# Patient Record
Sex: MALE | Race: WHITE
[De-identification: names, ages, dates, MRNs, and addresses within clinical notes are randomized per-mention and may not be internally consistent; named-entity substitution may affect disease eponyms.]

---

## 2018-05-21 NOTE — 2DMMODE
[place] [address]
Phone:  [phone] 2 D/M-MODE ECHOCARDIOGRAM     
_______________________________________________________________________________
 
Name:         MELLY BLANKENSHIP            Room:                     REG CLI
M.R.#:    [id number]     Account #:     [id number]  
Admission:    18    Attend Phys:   Jax Stapleton
Discharge:                Date of Birth: [birth year]  
Date of Service: 18 1526  Report #:      [id number]
        [id number]
_______________________________________________________________________________
THIS REPORT FOR:  //name//                      
 
 
--------------- APPROVED REPORT --------------
 
 
Study performed:  2018 14:17:01
 
EXAM: Comprehensive 2D, Doppler, and color-flow 
Echocardiogram 
Patient Location: Out-Patient   
      Status:  routine
 
      BSA:         2.07
HR: 75 bpm BP:          120/80 mmHg 
 
Other Information 
Study Quality: Good
 
Indications
Murmur
 
2D Dimensions
   LVEF(%):  56.77 (&gt;50%)
IVSd:  13.30 (7-11mm) LVOT Diam:  20.00 (18-24mm) 
LVDd:  36.25 mm  
PWd:  10.90 (7-11mm) Ascending Ao:  28.84 (22-36mm)
LVDs:  25.71 (25-40mm) 
Aortic Root:  28.02 mm 
   Valencia's LVEF:  56.77 %
 
Volumes
Left Atrial Volume (Systole) 
    LA ESV Index:  9.90 mL/m2
 
Aortic Valve
AoV Peak Oscar.:  0.98 m/s 
AO Peak Gr.:  3.82 mmHg  LVOT Max PG:  3.79 mmHg
AO Mean Gr.:  2.08 mmHg  LVOT Mean P.83 mmHg
    LVOT Max V:  0.97 m/s
AO V2 VTI:  17.69 cm  LVOT Mean V:  0.62 m/s
HYACINTH (VTI):  3.61 cm2  LVOT V1 VTI:  20.32 cm
 
Mitral Valve
    E/A Ratio:  0.64
    MV Decel. Time:  292.14 ms
 
 
[place] [address]
Phone:  [phone]                     2 D/M-MODE ECHOCARDIOGRAM     
_______________________________________________________________________________
 
Name:         MELLY BLANKENSHIP            Room:                     REG CLI
M.R.#:    [id number]     Account #:     [id number]  
Admission:    18    Attend Phys:   Jax Stapleton
Discharge:                Date of Birth: [birth year]  
Date of Service: 18 1526  Report #:      [id number]
        [id number]
_______________________________________________________________________________
MV E Max Oscar.:  0.43 m/s 
MV PHT:  84.72 ms  
MVA (PHT):  2.60 cm2  
 
TDI
E/Lateral E':  6.14 E/Medial E':  6.14
   Medial E' Oscar.:  0.07 m/s
   Lateral E' Oscar.:  0.07 m/s
 
Pulmonary Valve
PV Peak Oscar.:  1.31 m/s PV Peak Gr.:  6.84 mmHg
 
Tricuspid Valve
TR Peak Gr.:  19.87 mmHg RVSP:  24.87 mmHg
 
Left Ventricle
The left ventricle is normal size. There is normal LV segmental wall 
motion. There is normal left ventricular wall thickness. Left 
ventricular systolic function is normal. The left ventricular 
ejection fraction is within the normal range. LVEF is 55-60%. Grade I 
- abnormal relaxation pattern.
 
Right Ventricle
The right ventricle is normal size. The right ventricular systolic 
function is normal.
 
Atria
The left atrium size is normal. The right atrium size is 
normal.
 
Aortic Valve
Aortic valve is mildly calcified. Mild aortic regurgitation. There is 
no aortic valvular stenosis.
 
Mitral Valve
The mitral valve is normal in structure. Mild mitral regurgitation. 
No evidence of mitral valve stenosis.
 
Tricuspid Valve
The tricuspid valve is normal in structure. Mild tricuspid 
regurgitation. The RVSP is 30_ mmHg.
 
Pulmonic Valve
The pulmonary valve is normal in structure. There is no pulmonic 
valvular regurgitation.
 
 
 
[place] [address]
Phone:  [phone]                     2 D/M-MODE ECHOCARDIOGRAM     
_______________________________________________________________________________
 
Name:         MELLY BLANKENSHIP            Room:                     REG CLI
M.RKierra#:    [id number]     Account #:     [id number]  
Admission:    18    Attend Phys:   Jax Stapleton
Discharge:                Date of Birth: [birth year]  
Date of Service: 18 1526  Report #:      [id number]
        [id number]
_______________________________________________________________________________
Great Vessels
The aortic root is normal in size. IVC is normal in size and 
collapses with &gt;50% inspiration
 
Pericardium
There is no pericardial effusion.
 
&lt;Conclusion&gt;
Aortic valve is mildly calcified.
Mild mitral regurgitation.
Mild aortic regurgitation.
LVEF is 55-60%.
 
 
 
 
 
 
 
 
 
 
 
 
 
 
 
 
 
 
 
 
 
 
 
 
 
 
 
 
 
 
 
 
  <ELECTRONICALLY SIGNED>
                                           By: Edmar Velazquez MD, FACC      
  18     1526
D: 18 1526   _____________________________________
T: 18 1526   Edmar Velazquez MD, FACC        /INF

## 2018-10-05 NOTE — NUR
PATIENT ARRIVED TO THE UNIT AT APPROX 1500. ALERT AND ORIENTE X4. ADMISSION
HISTORY AND ASSESSMENT COMPLETED AND AS CHARTED. VSS ON ROOM AIR. NO
COMPLAINTS OF PAIN, NAUSEA, OR SOA. FLUIDS STARTED AND INFUSING AS ORDERED.
URINAL AND STRAINER PLACED IN RESTROOM TO MONITOR FOR STONE. HOURLY ROUNDS
MAINTAINED, CALL LIGHT WITHIN REACH, NURSING WILL CONTINUE TO MONITOR.

## 2018-10-05 NOTE — EKG
[place] [address]
Phone:  [phone]                     ELECTROCARDIOGRAM REPORT      
_______________________________________________________________________________
 
Name:       MELLY BLANKENSHIP             Room:           [address]    [place]  
.R.#:  [id number]      Account #:      [id number]  
Admission:  10/05/18     Attend Phys:    Giancarlo Gomez, 
Discharge:               Date of Birth:  [birth year]  
         Report #: [id number]
    [id number]
_______________________________________________________________________________
THIS REPORT FOR:  //name//                      
 
                         [facility] ED
                                       
Test Date:    2018-10-05               Test Time:    09:14:45
Pat Name:     MELLY BLANKENSHIP         Department:   
Patient ID:   [id number]            Room:         [facility]
Gender:                               Technician:   ALYSSIA KEMP
:          1940               Requested By: Familia Krishnamurthy
Order Number: [id number] MD:   Bc Lobo
                                 Measurements
Intervals                              Axis          
Rate:         87                       P:            44
PA:           152                      QRS:          -37
QRSD:         99                       T:            19
QT:           384                                    
QTc:          462                                    
                           Interpretive Statements
Sinus rhythm
Ventricular premature complex
Left axis deviation
Compared to ECG 10/20/2016 12:26:51
Ventricular premature complex(es) now present
 
Electronically Signed On 10-5-2018 16:31:35 CDT by Bc Lobo
[email or web address]
 
 
 
 
 
 
 
 
 
 
 
 
 
 
 
 
 
  <ELECTRONICALLY SIGNED>
                                           By: Bc Lobo MD, [facility]     
  10/05/18     1631
D: 10/05/18 0914   _____________________________________
T: 10/05/18 0914   Bc Lobo MD, [facility]       /EPI

## 2018-10-06 NOTE — NUR
Alert and oriented x 4. Vitals are stable. He is up independently in his room.
He was voiding and straining his urine himself. He has had nothing by mouth
since midnight. He slept well.

## 2018-10-06 NOTE — NUR
PATIENT ALERT AND ORIENTED X 4. VITAL SIGNS STABLE ON ROOM AIR. UP AD SHANA IN
ROOM. IV PATENT WITH FLUIDS INFUSING. DENIES PAIN AND NAUSEA. PATIENT HAD
CYSTOSCOPY TODAY WITH LEFT RETROGRADE PYELOGRAM & URETEROSCOPY WITH STONE
EXTRACTION & STENT PLACEMENT. URINE IS BLOOD TINGED DUE TO PROCEDURE. VOIDING
WITHOUT DIFFICULTY. HOURLY ROUNDS MAINTAINED THROUGHOUT THE SHIFT. CALL LIGHT
WITHIN REACH. NURSING WILL CONTINUE TO MONITOR.

## 2018-10-06 NOTE — NUR
PATIENT ARRIVED BACK TO UNIT FROM PACU AT 1120. VITAL SIGNS STABLE ON ROOM
AIR. DENIES PAIN AND NAUSEA. REGULAR DIET STARTED. DRESSING TO PENIS/SCROTAL
AREA C/D/I. RESTING COMFORTABLY.  WILL CONTINUE HOURLY ROUNDS. CALL LIGHT
WITHIN REACH. NURSING WILL CONTINUE TO MONITOR.

## 2018-10-07 NOTE — NUR
ALERT AND ORIENTED X4.  DENIED NEED FOR PAIN MEDICATIONS.  UP AD SHANA IN ROOM.
URINE BROWN IN COLOR.  PATIENT VOIDING WITHOUT DIFFICULTY.  CONTINUE TO STRAIN
ALL URINE.  IVF INFUSING WITHOUT DIFFICULTY.  CALL LIGHT WITHIN REACH.

## 2018-10-07 NOTE — OP
[address]  [address]                    OPERATIVE REPORT              
_______________________________________________________________________________
 
Name:       MELLY BLANKENSHIP             Room:           [address] [place]  
.R.#:  [id number]      Account #:      [id number]  
Admission:  10/05/18     Attend Phys:    Giancarlo Gomez, 
Discharge:               Date of Birth:  [birth year]  
         Report #: [id number]
                                                                     [id number]  
_______________________________________________________________________________
THIS REPORT FOR:  //name//                      
 
CC: Giancarlo Stapleton
 
DATE OF SERVICE:  10/06/2018
 
 
PREOPERATIVE DIAGNOSES:
1.  Ureteral stone.
2.  Renal stone.
3.  Renal failure due to ureteral obstruction.
 
POSTOPERATIVE DIAGNOSES:
1.  Ureteral stone.
2.  Renal stone.
3.  Renal failure due to ureteral obstruction.
 
PROCEDURE PERFORMED:  Cystoscopy, left retrograde pyelogram, ureteroscopy, stone
extraction and stent placement.
 
SURGEON:  Benoit Lorenzo MD.
 
ANESTHESIA:  General.
 
ESTIMATED BLOOD LOSS:  Minimal.
 
COMPLICATIONS:  None.
 
INDICATION FOR PROCEDURE:  This is a 77-year-old gentleman who was hospitalized
for severe flank pain.  He had a CT scan, which revealed a distal left ureteral
stone, hydronephrosis and several renal stones.  His creatinine was also
elevated indicating some renal failure.  His options for management were
discussed in detail.  He elected to proceed with cystoscopy, left retrograde
pyelogram, ureteroscopy, laser lithotripsy and stent placement.  The risks,
benefits, possible complications were explained in detail to both he and his
family.  They voiced clear understanding and would like to proceed.
 
DESCRIPTION OF PROCEDURE:  After obtaining informed consent, the patient was
taken to the operating room and placed in supine position.  After adequate
general anesthesia and IV antibiotics, he was prepped and draped in the dorsal
lithotomy position.  A 21-Citizen of [place] cystoscope with 30-degree lens was introduced
into the anterior urethra, which was normal all the way down the prostatic
urethra with moderate prostatic enlargement.  Upon entering the bladder, the
bladder was systematically inspected.  There were no tumors or diverticula. 
There was a small 1-mm fragment of stone at the base.  This was irrigated out. 
 
 
 
[place] [address]                    OPERATIVE REPORT              
_______________________________________________________________________________
 
Name:       MELLY BLANKENSHIP             Room:           [address] [place]  
..#:  [id number]      Account #:      [id number]  
Admission:  10/05/18     Attend Phys:    Giancarlo Gomez, 
Discharge:               Date of Birth:  [birth year]  
         Report #: [id number]
                                                                     [id number]  
_______________________________________________________________________________
It did not appear to match the size of the stone in his ureter visualized on
CT,
so a left
retrograde pyelogram was performed using an open-ended ureteral catheter.  There
was a very stenotic appearing intramural ureter with a significant amount of
proximal hydroureter.  There is a possible filling defect in the distal ureter,
so a sensor guidewire was passed in retrograde fashion.  In doing so
hydronephrotic jet expelled another piece of stone.  This was flushed out of the
bladder.  Rigid ureteroscopy was carried out alongside the wire.  No visible
stones were identified within the distal ureter.  So, an access sheath was
gently passed over the wire under fluoroscopic guidance.  This was an 11 x
13-Citizen of [place] access sheath.  It passed quite easily.  A second wire was placed as a
safety wire and flexible ureteroscopy was carried out up into the kidney.  All
the calices were inspected.  There were 3 separate stones ranging from 2 mm to 4
mm.  These were basketed and retrieved atraumatically through the dilated
proximal ureter and 
access
sheath.
 The remainder of the kidney was inspected.  There were several small Nagi's
plaques, but no other stones within the collecting system.  The access sheath
was removed.  The ureter was inspected with removal of the ureteroscope.  There
was no evidence of any ureteral injury.  No evidence of any stones within the
ureter.  There was some edema in the distal ureter where the stone had been
lodged.  It was determined that a stent would be replaced.  The cystoscope was
placed in the bladder and a 4.8 x 28 cm double-J stent was passed in retrograde
fashion.  A good coil was noted overlying the renal pelvis.  A good coil was
directly visualized in the bladder.  Bladder was drained.  Uro-Jet was applied
per urethra.  The patient was extubated and taken to recovery room in good
condition.  The plan is to return his care to the floor and check his
creatinine.  When stable, he can discharge home and follow up in 1-2 weeks for
stent removal.
 
 
 
 
 
 
 
 
 
 
 
 
 
<ELECTRONICALLY SIGNED>
                                        By:  Benoit Lorenzo MD         
10/07/18     1429
D: 10/06/18 1800_______________________________________
T: 10/06/18 1853Jarenny Lorenzo MD            /nt

## 2018-10-07 NOTE — CON
[address]  [address]                    CONSULTATION                  
_______________________________________________________________________________
 
Name:       MELLY BLANKENSHIP             Room:           [address] [place]  
..#:  [id number]      Account #:      [id number]  
Admission:  10/05/18     Attend Phys:    Giancarlo Gomez, 
Discharge:               Date of Birth:  [birth year]  
         Report #: [id number]
                                                                     [id number]  
_______________________________________________________________________________
THIS REPORT FOR:  //name//                      
 
CC: Giancarlo Gomez
    Jax Deyo
 
DATE OF SERVICE:  10/06/2018
 
 
NEPHROLOGY CONSULTATION
 
CONSULTING PHYSICIAN:  Dr. Gomez.
 
REASON FOR NEPHROLOGY CONSULTATION:  Acute kidney injury.
 
CHIEF COMPLAINT:  Left lower quadrant and flank pain.
 
HISTORY OF PRESENT ILLNESS:  This is a very pleasant 77-year-old male who has
past medical history of kidney stones.  He takes Topamax for neuropathy and he
states that he was started on that 5 months ago.  He came in with left flank
pain and left-sided abdominal pain.  He denied any dysuria.  Abdominal imaging
showed evidence of a 3-mm left UPJ stone causing moderate obstructive uropathy
with left-sided hydronephrosis and perinephric stranding.  He also has bilateral
stone burden, which were like small in nature.  His creatinine was 1.1 in 2016,
but creatinine at this time was 2.2 on admission and 2.4 this morning as well as
rest of the evening.  Urology has already seen him.  He does take ibuprofen
intermittently.  He does not take any ACE inhibitor or ARB.  He also had an
episode of nausea and vomiting yesterday, but his symptoms are much better
today.  His urine output has not been documented.  He is actually resting
comfortably right now.
 
ALLERGIES:  No known drug allergies.
 
REVIEW OF SYSTEMS:  This is as mentioned in the history of present illness.  He
is currently not having any pain, nausea, vomiting or diarrhea.
 
PAST MEDICAL HISTORY:  Includes history of kidney stones in the past.  He has
neuropathy.  He has history of psychosis, history of suicidal ideation and
depression.
 
PAST SURGICAL HISTORY:  He denied any hip surgeries.
 
FAMILY HISTORY:  He denied any kidney disease in the family.  He did say that
his brother has history of kidney stones.
 
SOCIAL HISTORY:  He lives at home with his wife.  Denies any use of tobacco,
alcohol or illicit drug use.
 
 
 
[place] [address]                    CONSULTATION                  
_______________________________________________________________________________
 
Name:       MELLY BLANKENSHIP             Room:           [address]#:  [id number]      Account #:      [id number]  
Admission:  10/05/18     Attend Phys:    Giancarlo Gomez, 
Discharge:               Date of Birth:  [birth year]  
         Report #: [id number]
                                                                     [id number]  
_______________________________________________________________________________
 
HOME MEDICATIONS:  Include Topamax 25 mg b.i.d., primidone, olanzapine,
methotrexate, propranolol, aspirin 81, gabapentin, fluoxetine, bupropion,
escitalopram, methylphenidate, lorazepam, multivitamins and calcium supplement.
 
PHYSICAL EXAMINATION:
VITAL SIGNS:  Blood pressure is 114/76, respiratory rate is 18, pulse rate is
78, temperature 36.8 and pulse ox is 98% on room air.
GENERAL:  He is awake, alert and oriented x 3.
HEAD, EYES, EARS, NOSE AND THROAT:  Mucous membranes are moist.
NECK:  There is no JVD.
CHEST:  Clear to auscultation bilateral.  No crackles or wheezing.
CARDIOVASCULAR:  S1, S2 normal.  No murmurs, rubs or gallops.
ABDOMEN:  Soft, nondistended and nontender.  Bowel sounds are present.
BACK:  He has no CVA tenderness.
EXTREMITIES:  Lower extremities, there is no edema.
NEUROLOGICAL FUNCTION:  Gross neurological function is intact.
PSYCHIATRIC:  Mood and affect seem to be normal.
 
LABORATORY DATA:  Hemoglobin 13.4, WBC 7.0.  Creatinine 2.4, BUN 29, chloride
111 and sodium 144.  Creatinine was 2.2 yesterday morning and other labs are
reviewed.
 
IMAGING:  Chest x-ray, abdominal x-ray and abdominal and pelvic CT were
reviewed.
 
ASSESSMENT:
1.  Acute kidney injury, likely because of obstructive uropathy.  Baseline
creatinine 1.1, but creatinine is now 2.4.  U/A showed evidence of 1+ blood, but
no protein.
2.  Obstructive uropathy, left UPJ stone causing left hydronephrosis and
perinephric stranding as well as bilateral stone burden.
3.  History of neuropathy and takes Topamax for that.
4.  History of psychosis and depression.
 
PLAN:
1.  Acute kidney injury is likely because of obstructive uropathy.  I agree with
Urology's plan for urological intervention for stone retrieval and possible
stent placement.
2.  Continue IV fluids with normal saline at 100 mL an hour.
3.  Based upon his age and renal insufficiency, we will also check serum
immunofixation and serum kappa-to-lambda light chain ratio.
4.  Agree with Neurology.  Topamax causes increased stone burden and would
recommend speaking with Neurology to see if Topamax can be tapered down to off.
 
Thank you for the consultation.  Please make sure his I's and O's are being
 
 
 
[place] [address]                    CONSULTATION                  
_______________________________________________________________________________
 
Name:       MELLY BLANKENSHIP             Room:           [address] [place]  
.R.#:  [id number]      Account #:      [id number]  
Admission:  10/05/18     Attend Phys:    Giancarlo Gomez, 
Discharge:               Date of Birth:  [birth year]  
         Report #: [id number]
                                                                     [id number]  
_______________________________________________________________________________
documented.  I discussed the plan with the patient as well as the patient's
nurse and we will continue to follow with you.
 
 
 
 
 
 
 
 
 
 
 
 
 
 
 
 
 
 
 
 
 
 
 
 
 
 
 
 
 
 
 
 
 
 
 
 
 
 
 
 
 
 
<ELECTRONICALLY SIGNED>
                                        By:  Mari Wei MD            
10/07/18     0835
D: 10/06/18 0818_______________________________________
T: 10/06/18 1340Mari Wei MD               /nt

## 2018-10-07 NOTE — NUR
ASSUMED CARE OF PATIENT AFTER MORNING REPORT AT APPROX 0730. ALERT AND
ORIENTED X4. ASSESSMENT COMPLETED AND AS CHARTED. VSS ON ROOM AIR. NO
COMPLAINTS OF PAIN, NAUSEA, OR SOA. FLUIDS INFUSED AS ORDERED. PATIENT WAITING
TO BE DISCHARGED, DR CORNELL ORDERED NEURO CONSULT TO ADDRESS MEDICATION,
NEPHROLOGY SUGGESTS TOMAMAX IS CAUSING KIDNEY STONES. PATIENT IN RECLINER
WATCHIGN TV AT THIS TIME. HOURLY ROUNDS MAINTAINED, CALL LIGHT WITHIN REACH,
NURSING WILL CONTINUE TO MONITOR.

## 2018-10-08 NOTE — CON
[address]  [address]                    CONSULTATION                  
_______________________________________________________________________________
 
Name:       MELLY BLANKENSHIP             Room:           [address] [place]  
..#:  [id number]      Account #:      [id number]  
Admission:  10/05/18     Attend Phys:    Giancarlo Gomez, 
Discharge:  10/07/18     Date of Birth:  [birth year]  
         Report #: [id number]
                                                                     [id number]  
_______________________________________________________________________________
THIS REPORT FOR:  //name//                      
 
CC: Giancarlo Mijaresrosanne Daya
 
DATE OF SERVICE:  10/05/2018
 
 
REFERRING PHYSICIAN:  Dr. Gomez.
 
REASON FOR CONSULTATION:  Left ureteral calculus and renal insufficiency.
 
HISTORY OF PRESENT ILLNESS:  This is a 77-year-old male who reports multiple
stones in the past, but states they have not required procedures and have passed
spontaneously.  He denies previous urologic evaluation.  He presented to the
Emergency Department with a several-day history of left-sided abdominal and
flank pain accompanied by nausea and vomiting.  He denies fever or chills.  He
denies dysuria, gross hematuria or fragment passage.  Denies hesitancy, slow
stream or incomplete emptying.  He denies any other recent illness.  CT scan
revealed a 3 mm left ureterovesical junction stone and lab revealed a creatinine
of 2.2.  He was admitted for further evaluation and management as well as
hydration and Urology was consulted.
 
PAST MEDICAL HISTORY:  As above.  The patient also has a history of depression
and reported history of psychosis and suicidal ideation, though he denies these.
 
ALLERGIES:  No known drug allergies.
 
MEDICATIONS:  List is reviewed and includes Topamax.
 
FAMILY HISTORY:  He does not know of any family history of renal disease or
stones.
 
SOCIAL HISTORY:  He denies use of tobacco or alcohol.
 
REVIEW OF SYSTEMS:  As per the history of present illness.  He denies cough,
shortness of breath, chest pain.  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
to an infarction.  He is unsure of details regarding that.
 
PHYSICAL EXAMINATION:
VITAL SIGNS:  Temperature 36.9, pulse 66, respirations 16, blood pressure
137/83.
GENERAL:  This is a 77-year-old male in no acute distress.  He is awake, alert
and answers questions appropriately.
HEENT:  Normocephalic, atraumatic.  He has amblyopia.  Oropharynx is clear.
NECK:  Supple.  No JVD.
 
 
 
[place] [address]                    CONSULTATION                  
_______________________________________________________________________________
 
Name:       BLANKENSHIPMELLY BRIANA             Room:           [address]#:  [id number]      Account #:      [id number]  
Admission:  10/05/18     Attend Phys:    Giancarlo Gomez, 
Discharge:  10/07/18     Date of Birth:  [birth year]  
         Report #: [id number]
                                                                     [id number]  
_______________________________________________________________________________
RESPIRATORY:  Effort and excursion are normal.
CARDIOVASCULAR:  Rhythm is regular.  Radial pulses are palpable.
CHEST:  Chest wall is nontender.
ABDOMEN:  Soft, nontender, nondistended.  Spine and costovertebral angles are
nontender.  Bladder is nontender and nonpalpable.
EXTREMITIES:  Warm.  Moves all extremities well.  No peripheral edema.  Radial
pulses are palpable.
GENITOURINARY:  Reveals normal skin of the penis and scrotum.  Urethral meatus
is orthotopic.  Left testicle is somewhat atrophic with no palpable masses. 
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  No palpable inguinal hernias.
 
LABORATORY DATA:  Include a white count of 9.6, hemoglobin 15.6, platelet count
191,000.  Sodium 141, potassium 3.8, chloride 106, CO2 of 27, BUN 25, creatinine
2.2, glucose 131.
 
IMAGING:  Noncontrast CT scan of the abdomen and pelvis reveals bilateral
nonobstructing renal calculi and a 3 mm left ureterovesical junction calculus
with hydronephrosis.  Dipstick urinalysis reveals 1+ blood.  It is otherwise
unremarkable.  I have ordered a urine culture.  CT images were reviewed. 
Findings were discussed in detail with the patient and his wife.
 
IMPRESSION:  
1.  Left distal ureteral calculus, bilateral renal calculi.
2.  Renal insufficiency with creatinine of 2.2.
 
ASSESSMENT AND PLAN:  We discussed alternatives for management including medical
expulsive therapy, outpatient extracorporeal shockwave lithotripsy, cystoscopy
with retrograde pyelogram and stent placement as well as possible ureteroscopic
stone manipulation.  We discussed the implications of his elevated creatinine. 
I would recommend avoiding nephrotoxins such as Toradol.  We will add Flomax. 
Strain urine for stones.  Check a urine culture.  Check KUB and chest x-ray. 
Repeat a BMP in the morning.  The patient has eaten recently and will be made
n.p.o. after midnight in case intervention is needed.  Consider Neurology
evaluation regarding stopping Topamax due to its association with increased
stone risk.  We will follow along.
 
 
 
 
 
 
 
 
 
<ELECTRONICALLY SIGNED>
                                        By:  Bc Burr MD             
10/08/18     1249
D: 10/05/18 1744_______________________________________
T: 10/06/18 0349Jomagali Burr MD                /nt

## 2018-10-11 NOTE — CON
[address]  [address]                    CONSULTATION                  
_______________________________________________________________________________
 
Name:       MELLY BLANKENSHIP             Room:           [address] [place]  
.#:  [id number]      Account #:      [id number]  
Admission:  10/09/18     Attend Phys:    ANA ROSA Orozco
Discharge:  10/10/18     Date of Birth:  [birth year]  
         Report #: [id number]
                                                                     [id number]  
_______________________________________________________________________________
THIS REPORT FOR:  //name//                      
 
CC: Jax De Jesus
 
DATE OF SERVICE:  10/10/2018
 
 
CONSULTING PHYSICIAN:  Eloy De Jesus DO
 
REASON FOR CONSULTATION:  Hematuria.
 
HISTORY OF PRESENT ILLNESS:  This is a 77-year-old gentleman who recently
underwent left ureteroscopy and double-J stent placement for an obstructing
kidney stone just about 5 days ago, was admitted with gross hematuria, which
have become more pronounced, but without any clots.  He was admitted and was
having some left flank discomfort.  He is now feeling well.  He had a Griffin
catheter placed and was started on some IV fluids.  I was asked to see him for
the hematuria.  He presently has no complaints and has been seen by Urology and
cleared for discharge today.
 
REVIEW OF SYSTEMS:  Constitutional, psych, heme, eyes, ENT, respiratory,
cardiac, GI, , endocrine all negative except as documented above.
 
PAST MEDICAL HISTORY:  Nephrolithiasis, depression, anxiety, GERD.  There is
mention of a history of hypertension.
 
MEDICATIONS:  Reviewed.
 
FAMILY HISTORY:  Not pertinent in this 77-year-old gentleman.
 
SOCIAL HISTORY:  No tobacco.
 
PHYSICAL EXAMINATION:
VITAL SIGNS:  Blood pressure 142/85, pulse 67, temperature 36.9.
GENERAL:  No acute distress.
EYES:  Open.
EARS:  Externally normal.
CARDIOVASCULAR:  Regular rate.
LUNGS:  No crackles.
ABDOMEN:  Soft.
LYMPHATICS:  No peripheral pitting edema.
PSYCHIATRIC:  Awake, alert.
 
LABORATORY DATA:  White cell count 5.5, hemoglobin 14.1, platelets 201.  Sodium
141, potassium 5.1, chloride 106, bicarbonate 29, BUN 22, creatinine 1.3,
 
 
 
[place] [address]                    CONSULTATION                  
_______________________________________________________________________________
 
Name:       MELLY BLANKENSHIP             Room:           [address]#:  [id number]      Account #:      [id number]  
Admission:  10/09/18     Attend Phys:    ANA ROSA Orozco
Discharge:  10/10/18     Date of Birth:  [birth year]  
         Report #: [id number]
                                                                     [id number]  
_______________________________________________________________________________
glucose 109, calcium 9.2, albumin 3.5.
 
ASSESSMENT:
1.  Acute kidney injury.  On 10/07/2018 creatinine was 1.8, 10/06/2018
creatinine was 2.4.  It is now 1.3 and has been improving.  This is in the
setting of a kidney stone.  In 10/2016, he had a creatinine of 1.1.
2.  Hematuria, status post obstructing renal stone, which was removed and he had
a left double-J stent placed.  He has been seen by Urology.
3.  Nephrolithiasis.  He had a recent 3 mm stone, which was obstructing the left
UVJ.  He is status post left double-J stent.  He was started on Topamax back in
July and this is associated with calcium phosphate stones.  He has already
called the prescribing doctor to see if this can be changed to something else.
 
PLAN:  From my standpoint, the hematuria is likely related to the kidney stone
and there is no additional workup needed from a renal standpoint at this time. 
Urology has seen him and he will follow up with them as an outpatient.  We will
check a creatinine today to ensure that it is stable and outpatient workup for
kidney stone can be completed including a 24-hour urine.  He can have this done
at the urologist's office or follow up with us as an outpatient to have this
completed.  No other recommendations.
 
Thank you for requesting my opinion in the care and management of this patient. 
I will sign off.
 
 
 
 
 
 
 
 
 
 
 
 
 
 
 
 
 
 
 
 
 
<ELECTRONICALLY SIGNED>
                                        By:  Merced Charlton MD              
10/11/18     1004
D: 10/10/18 1131_______________________________________
T: 10/10/18 1745Abiana rosa Charlton MD                 /nt

## 2019-07-15 NOTE — EKG
[place] [address]
Phone:  [phone]                     ELECTROCARDIOGRAM REPORT      
_______________________________________________________________________________
 
Name:       MELLY BLANKENSHIP             Room:           [address]    ADM [place]  
.R.#:  [id number]      Account #:      [id number]  
Admission:  19     Attend Phys:    ANA ROSA Orozco
Discharge:               Date of Birth:  [birth year]  
         Report #: [id number]
    [id number]
_______________________________________________________________________________
THIS REPORT FOR:  //name//                      
 
                         [facility] ED
                                       
Test Date:    2019               Test Time:    17:31:42
Pat Name:     MELLY BLANKENSHIP         Department:   
Patient ID:   [id number]            Room:         [facility]
Gender:       M                        Technician:   AKUA
:          1940               Requested By: Hernandez Palma
Order Number: [id number] MD:   Bc Lobo
                                 Measurements
Intervals                              Axis          
Rate:         69                       P:            47
IL:           154                      QRS:          -20
QRSD:         107                      T:            71
QT:           419                                    
QTc:          449                                    
                           Interpretive Statements
Sinus rhythm
Borderline left axis deviation
Compared to ECG 10/05/2018 09:14:45
Ventricular premature complex(es) no longer present
 
Electronically Signed On 7- 12:16:48 CDT by Bc Lobo
[email or web address]
 
 
 
 
 
 
 
 
 
 
 
 
 
 
 
 
 
 
  <ELECTRONICALLY SIGNED>
                                           By: Bc Lobo MD, [facility]     
  07/15/19     1216
D: 19 1731   _____________________________________
T: 19 1731   Bc Lobo MD, [facility]       /EPI

## 2019-11-02 ENCOUNTER — HOSPITAL ENCOUNTER (EMERGENCY)
Dept: HOSPITAL 96 - M.ERS | Age: 79
Discharge: HOME | End: 2019-11-02
Payer: MEDICARE

## 2019-11-02 VITALS — HEIGHT: 70 IN | WEIGHT: 185.01 LBS | BODY MASS INDEX: 26.49 KG/M2

## 2019-11-02 VITALS — WEIGHT: 180.01 LBS | BODY MASS INDEX: 25.77 KG/M2 | HEIGHT: 70 IN

## 2019-11-02 VITALS — DIASTOLIC BLOOD PRESSURE: 89 MMHG | SYSTOLIC BLOOD PRESSURE: 131 MMHG

## 2019-11-02 VITALS — SYSTOLIC BLOOD PRESSURE: 141 MMHG | DIASTOLIC BLOOD PRESSURE: 84 MMHG
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
## 2019-11-02 DIAGNOSIS — K21.9: ICD-10-CM

## 2019-11-02 DIAGNOSIS — Z96.653: ICD-10-CM
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
## 2019-11-02 DIAGNOSIS — Z77.22: ICD-10-CM
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
## 2019-11-02 DIAGNOSIS — S80.212A: ICD-10-CM

## 2019-11-02 DIAGNOSIS — M62.830: Primary | ICD-10-CM

## 2019-11-02 DIAGNOSIS — Z86.73: ICD-10-CM

## 2019-11-02 DIAGNOSIS — S01.112A: Primary | ICD-10-CM
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
## 2019-11-02 LAB
ABSOLUTE BASOPHILS: 0.1 THOU/UL (ref 0–0.2)
ABSOLUTE EOSINOPHILS: 0.2 THOU/UL (ref 0–0.7)
ABSOLUTE MONOCYTES: 0.6 THOU/UL (ref 0–1.2)
ALBUMIN SERPL-MCNC: 4 G/DL (ref 3.4–5)
ALP SERPL-CCNC: 86 U/L (ref 46–116)
ALT SERPL-CCNC: 24 U/L (ref 30–65)
ANION GAP SERPL CALC-SCNC: 9 MMOL/L (ref 7–16)
AST SERPL-CCNC: 22 U/L (ref 15–37)
BASOPHILS NFR BLD AUTO: 1.3 %
BILIRUB SERPL-MCNC: 0.9 MG/DL
BUN SERPL-MCNC: 26 MG/DL (ref 7–18)
CALCIUM SERPL-MCNC: 9.4 MG/DL (ref 8.5–10.1)
CHLORIDE SERPL-SCNC: 105 MMOL/L (ref 98–107)
CO2 SERPL-SCNC: 26 MMOL/L (ref 21–32)
CREAT SERPL-MCNC: 1.2 MG/DL (ref 0.6–1.3)
EOSINOPHIL NFR BLD: 4.2 %
GLUCOSE SERPL-MCNC: 95 MG/DL (ref 70–99)
GRANULOCYTES NFR BLD MANUAL: 47.5 %
HCT VFR BLD CALC: 47.8 % (ref 42–52)
HGB BLD-MCNC: 16.6 GM/DL (ref 14–18)
INR PPP: 1
LIPASE: 123 U/L (ref 73–393)
LYMPHOCYTES # BLD: 2.2 THOU/UL (ref 0.8–5.3)
LYMPHOCYTES NFR BLD AUTO: 37.2 %
MCH RBC QN AUTO: 31.4 PG (ref 26–34)
MCHC RBC AUTO-ENTMCNC: 34.7 G/DL (ref 28–37)
MCV RBC: 90.3 FL (ref 80–100)
MONOCYTES NFR BLD: 9.8 %
MPV: 7.2 FL. (ref 7.2–11.1)
NEUTROPHILS # BLD: 2.8 THOU/UL (ref 1.6–8.1)
NT-PRO BRAIN NAT PEPTIDE: 65 PG/ML (ref ?–300)
NUCLEATED RBCS: 0 /100WBC
PLATELET COUNT*: 208 THOU/UL (ref 150–400)
POTASSIUM SERPL-SCNC: 4.2 MMOL/L (ref 3.5–5.1)
PROT SERPL-MCNC: 7.4 G/DL (ref 6.4–8.2)
PROTHROMBIN TIME: 10.7 SECONDS (ref 9.2–11.5)
RBC # BLD AUTO: 5.29 MIL/UL (ref 4.5–6)
RDW-CV: 13.6 % (ref 10.5–14.5)
SODIUM SERPL-SCNC: 140 MMOL/L (ref 136–145)
WBC # BLD AUTO: 5.8 THOU/UL (ref 4–11)

## 2019-11-03 NOTE — EKG
[place] [address]
Phone:  [phone]                     ELECTROCARDIOGRAM REPORT      
_______________________________________________________________________________
 
Name:       MELLY BLANKENSHIP         Room:                      [facility]#:  [id number]      Account #:      [id number]  
Admission:  19     Attend Phys:                         
Discharge:  19     Date of Birth:  [birth year]  
         Report #: [id number]
    [id number]
_______________________________________________________________________________
THIS REPORT FOR:  //name//                      
 
                         [facility] ED
                                       
Test Date:    2019               Test Time:    09:02:37
Pat Name:     MELLY BLANKENSHIP         Department:   
Patient ID:   [id number]            Room:          
Gender:       M                        Technician:   
:          1940               Requested By: Allie Lucia
Order Number: [id number] MD:   Jerod Glass
                                 Measurements
Intervals                              Axis          
Rate:         69                       P:            41
IN:           152                      QRS:          -24
QRSD:         97                       T:            63
QT:           408                                    
QTc:          437                                    
                           Interpretive Statements
Sinus rhythm
Atrial premature complex
Borderline left axis deviation
Baseline wander in lead(s) V5
Compared to ECG 2019 17:31:42
Atrial premature complex(es) now present
 
Electronically Signed On 11-3-2019 14:28:44 CST by Jerod Glass
[email or web address]
 
 
 
 
 
 
 
 
 
 
 
 
 
 
 
 
  <ELECTRONICALLY SIGNED>
                                           By: Jerod Glass MD, [facility]   
  19     1428
D: 1902   _____________________________________
T: 19   Jerod Glass MD, [facility]     /EPI

## 2020-05-08 NOTE — CON
"The patient location is: home  The chief complaint leading to consultation is: FOG  Visit type: Virtual visit with synchronous audio and video  Total time spent with patient: 20 minutes  Each patient to whom he or she provides medical services by telemedicine is:  (1) informed of the relationship between the physician and patient and the respective role of any other health care provider with respect to management of the patient; and (2) notified that he or she may decline to receive medical services by telemedicine and may withdraw from such care at any time.    MOVEMENT DISORDERS CLINIC Follow-up NOTE    PCP/Referring Provider: No referring provider defined for this encounter.  Date of Service: 5/8/2020    Chief Complaint: FOG    Interval Hx    Since last visit,   -INCREASED Sinemet 25/100 1.5 tab PO QID  -Continued ropinirole 1 mg tab 3, 2, 2   -Continued rasagiline 1 mg Qdaily  Stopped rock steady boxing  Walks with a walker and just walks between rooms  FOG got markedly worse    Has not gotten laser cane    Falls: None    Feels like FOG got more prominent  Had one day of no FOG but doesn't understand why  -increased Sinemet 25/100 1 tab PO to QID from TID  Takes every 4 hours on average  -Continues ropinirole 1 mg tab 3, 2, 2   -Continues rasagiline 1 mg Qdaily    Thinks he doesn't have OFFtime but his voice comes and goes    No dyskinesias, no orthostasis    PD Review of Symptoms:  Anosmia: "hes's never had one"  Dysarthria/Hypophonia: Severe  Dysphagia/Sialorrhea: Yes to both  Hallucinations: None  Depression: None  Cognitive slowing: None  Impulsivity: None  Urinary changes: Frequency  Constipation: None  Orthostasis: None  Dyskinesia: None  Falls: As above  Freezing: Yes  Micrographia: Yes  Sleep issues:  -ADELINA: wears cpap  -RBD: None   -Sleep Quality: at times OK        "priorHPI:  2/17/20 Interval History    Currently started sinemet 1 tab TID and noticed improvement in his gait (no change in freezing) - " [address]  [address]                    CONSULTATION                  
_______________________________________________________________________________
 
Name:       MELLY BLANKENSHIP             Room:           [address] [place]  
M.R.#:  [id number]      Account #:      [id number]  
Admission:  07/12/19     Attend Phys:    ANA ROSA Orozco
Discharge:               Date of Birth:  [birth year]  
         Report #: [id number]
                                                                     [id number]  
_______________________________________________________________________________
THIS REPORT FOR:  //name//                      
 
CC: Jax De Jesus
 
NEUROLOGY CONSULTATION
 
HISTORY OF PRESENT ILLNESS:  The patient is a 78-year-old male who yesterday
became very dizzy.  He was not feeling well when he went to physical therapy. 
He has a problem with his right heel and after he went to therapy, he became
more unsteady.  In fact, he had difficulty walking.  He felt as though he was
walking from one side to the other and sometimes even walking backwards.  He
felt as though he were drunk, although he does not drink.  The patient denies
ringing in his ears, difficulty with his hearing or ear pain.
 
Yesterday, he also noticed sharp pain in his left shoulder that went down into
his upper arm, lasting several seconds.  He denies having weakness in one leg or
another.  In general, he just did not feel well when he walked.
 
The patient relates a history of peripheral neuropathy.  He has numbness and
tingling in his hands and feet.  He states that he has been to  for this and
no etiology for the neuropathy was found.  He also has essential tremor and was
referred to  for deep brain stimulator, but he declined this treatment.
 
PAST MEDICAL HISTORY:  Peripheral neuropathy, essential tremor, gastroesophageal
reflux, hernia, infarcted testicle, left eye strabismus.
 
PAST SURGICAL HISTORY:  Left eye surgery, vein ablation, right cataract with
intraocular lens implant, bilateral knee replacement, left wrist surgery.
 
MEDICATIONS:  Aspirin 81 mg daily, atorvastatin 20 mg at bedtime, calcium 500 mg
b.i.d., gabapentin 400 mg t.i.d., lorazepam 0.5 mg at bedtime, minocycline 100
mg b.i.d., multivitamin daily.
 
ALLERGIES:  None.
 
PHYSICAL EXAMINATION:
VITAL SIGNS:  Review of vital signs; temperature is 36.6, pulse rate 66,
respiratory rate 14, blood pressure 161/92, bedside pulse oximetry 96%.
NEUROLOGIC:  Cranial nerves II through XII are grossly intact with the exception
of the left eye.  The left lens is clouded and there is a left exophoria.  Motor
exam demonstrates symmetrical strength in all 4 extremities with tone and bulk
normal.  Reflexes are trace.  Plantar responses are flexor.  Coordination
reveals intact finger-to-nose.  Gait was not tested.
 
LABORATORY DATA:  Hematology; white blood cell count 5.2, hemoglobin 14.5,
 
 
 
[place] [address]                    CONSULTATION                  
_______________________________________________________________________________
 
Name:       MELLY BLANKENSHIP             Room:           [address]#:  [id number]      Account #:      [id number]  
Admission:  07/12/19     Attend Phys:    ANA ROSA Orozco
Discharge:               Date of Birth:  [birth year]  
         Report #: [id number]
                                                                     [id number]  
_______________________________________________________________________________
hematocrit 43.2.  Urinalysis negative.  Chemistry; sodium 141, potassium 4.1,
chloride 109, carbon dioxide 28, BUN 21, creatinine 1.2, glucose 89.  Liver
functions unremarkable.  Cholesterol normal.
 
IMAGING STUDIES:  CT scan of the head demonstrates no acute intracranial
abnormalities.  Chest x-ray, no acute process.
 
IMPRESSION:  This patient does have vertigo.  He also has a headache and has had
a headache for the past several days.  This may be contributing to the vertigo;
however, I have asked physical therapy to perform a bilateral Epley maneuver
since the vertigo was so intense.
 
I have discontinued Tylenol as a treatment for the headache and if there are no
contraindications, I would prefer the patient receive a Toradol injection. 
There are other medications that can be tried for the headache including
intravenous Depakote and/or a small dose of intravenous steroids.  If the
vertigo persists, the patient will need an MRI of the brain without contrast to
begin with.
 
Thank you for your kind referral of the patient.
 
 
 
 
 
 
 
 
 
 
 
 
 
 
 
 
 
 
 
 
 
 
 
 
<ELECTRONICALLY SIGNED>
                                        By:  Gay More DO          
07/14/19     1014
D: 07/13/19 1010_______________________________________
T: 07/13/19 1113Gay More DO             /nt "wife describes decreased shuffling.  Ropinirole was decreased tablets from 1 mg  3, 3, 2 tabs -> 3, 2, 2 tabs and noticed that gait subsequently worsened again.     "Prior HPI  Braulio Monterroso is a R HANDED 84 y.o. male with a medical issues significant for Pes, HTN, ADELINA, Cspine dz s/p surgery, hip replacement, who presents with Parkinson's with new onset FOG [facility]. He notes 10 years ago progressive dysphagia and hypophonia, followed by a shuffling gait at age 80. This started with a L-sided foot drag. He currently walks with a cane and can go half a mile with stops. Without stops he can go 1 block. After Jan 2018 he started having FOG spells. He has near constant FOG art this point. He currently uses a walker at home and a cane. He falls once a month usually forwards.     Started carbidopa/levodopa 1 year ago but after he started getting migraines.  He's never had a clear ONtime  He currently takes ropinirole 3/3/2mg  Rasagiline 1mg QHS  He has ankle swelling  No memory loss  No orthostasis  No impulsiveness    Goes to speech therapy and gets injections in his vocal cords    Does rock steady boxing  He rides a stationary bike"      Review of Systems:   Review of Systems   Constitutional: Negative for fever.   HENT: Negative for congestion.    Eyes: Negative for double vision.   Respiratory: Negative for cough and shortness of breath.    Cardiovascular: Negative for chest pain and leg swelling.   Gastrointestinal: Negative for nausea.   Genitourinary: Negative for dysuria.   Musculoskeletal: Positive for falls.   Skin: Negative for rash.   Neurological: Positive for tremors and speech change. Negative for headaches.   Psychiatric/Behavioral: Negative for depression.       Neuroleptic exposure:  None    Current Medications:  Outpatient Encounter Medications as of 5/8/2020   Medication Sig Dispense Refill    carbidopa-levodopa  mg (SINEMET)  mg per tablet Take 1.5 tablets by " mouth 4 (four) times daily. 120 tablet 11    cholecalciferol, vitamin D3, (VITAMIN D3) 2,000 unit Tab Take 2,000 Units by mouth 2 (two) times daily.      hydroCHLOROthiazide (HYDRODIURIL) 25 MG tablet Take 25 mg by mouth.      losartan (COZAAR) 50 MG tablet Take 50 mg by mouth.      metoprolol tartrate (LOPRESSOR) 25 MG tablet Take 25 mg by mouth 2 (two) times daily.      omeprazole (PRILOSEC) 20 MG capsule Take 20 mg by mouth.      rasagiline (AZILECT) 1 mg Tab Take 1 mg by mouth once daily.      rOPINIRole (REQUIP) 1 MG tablet Take 1 mg by mouth 3 (three) times daily. Take 3 tablets by mouth 2 times a day and take 2 tablets at bedtime      rosuvastatin (CRESTOR) 5 MG tablet Take 2.5 mg by mouth.      warfarin (COUMADIN) 5 MG tablet Take 5 mg by mouth.       No facility-administered encounter medications on file as of 5/8/2020.        Past Medical History:  Patient Active Problem List   Diagnosis    Parkinsonism    Ptosis       Past Surgical History:  No past surgical history on file.    Current Living Situation: home    Social:  Social History     Socioeconomic History    Marital status:      Spouse name: Not on file    Number of children: Not on file    Years of education: Not on file    Highest education level: Not on file   Occupational History    Not on file   Social Needs    Financial resource strain: Not on file    Food insecurity:     Worry: Not on file     Inability: Not on file    Transportation needs:     Medical: Not on file     Non-medical: Not on file   Tobacco Use    Smoking status: Never Smoker    Smokeless tobacco: Never Used   Substance and Sexual Activity    Alcohol use: Never     Frequency: Never    Drug use: Not on file    Sexual activity: Not on file   Lifestyle    Physical activity:     Days per week: Not on file     Minutes per session: Not on file    Stress: Not on file   Relationships    Social connections:     Talks on phone: Not on file     Gets  together: Not on file     Attends Muslim service: Not on file     Active member of club or organization: Not on file     Attends meetings of clubs or organizations: Not on file     Relationship status: Not on file   Other Topics Concern    Not on file   Social History Narrative    Not on file       Family History:  No family history on file.    PHYSICAL:  There were no vitals taken for this visit. Physical Exam  Constitutional: Well-developed, well-nourished, appears stated age  Eyes: No scleral icterus  ENT: Moist oral mucosa  Cardiovascular: No lower extremity edema   Respiratory: No labored breathing   Skin: No rash   Hematologic: No bruising  Psychiatric: No depression  Other: GI/ deferred   · Mental status: Alert and oriented to person, place, time, and situation;   · WORLD-DLROW; 5/5à 5/5 words, follows commands  · Speech: normal (not dysarthric), no aphasia  · Cranial nerves:            · CN II: Pupils mid-position and equal, not tested light or accommodation  · CN III, IV, VI: Extraocular movements full, no nystagmus visualized  · CN V: Not tested   · CN VII: Face strong  - L facial dystonia  · CN VIII: Hearing intact to voice and conversation   · CN IX, X: Palate raises midline and symmetric   · CN XI: Strong shoulder shrug B   · CN XII: Tongue appears midline   · Motor: Normal bulk by appearance, no drift   · Sensory: Not tested    · Gait: Not tested  · Deep tendon reflexes: Not tested  · Movement/Coordination                    Moderate hypophonic speech.                     Moderate facial masking    Bradykinesia  ? Finger taps Finger flicks SCOTTIE Heel taps   Left 2+ 2+  -   Right 2+ 2+ - -       No tremor    Laboratory Data:  NA    Imaging:  Brain MRI per report NL - no disc avaialble    Assessment//Plan:   Problem List Items Addressed This Visit        Neuro    Parkinsonism - Primary    Current Assessment & Plan     Rigid-predominate PDism. Appears under-dosed on his current regimen, without a  noticeable ONtime and prominent FOG. FOG worsened either due to lack of PT or increase in carbidopa/levodopa 25/100mg.    Suggested decrease  Sinemet 25/100 1.5 tab to 1 tab PO QID  -Continue ropinirole 1 mg tab 3, 2, 2   -Continue rasagiline 1 mg Qdaily  -Keep journal of PD symptoms/sinemet  -Use laser target for gait     If no change after 1 week, will suggest aggressive PT for FOG  Asked him to have his MRI brain uploaded to screen for signs of PSP given facial dystonia and FOG         Relevant Orders    Ambulatory referral/consult to Home Health          Follow-up: 1 mos  Discussed the importance of ongoing exercise in efforts to improve mobility and balance.    Amanda Delgado MD, MS [facility]  Department of Neurology  Movement Disorders

## 2020-10-03 ENCOUNTER — HOSPITAL ENCOUNTER (EMERGENCY)
Dept: HOSPITAL 96 - M.ERS | Age: 80
Discharge: HOME | End: 2020-10-03
Payer: MEDICARE

## 2020-10-03 VITALS — SYSTOLIC BLOOD PRESSURE: 124 MMHG | DIASTOLIC BLOOD PRESSURE: 79 MMHG

## 2020-10-03 VITALS — HEIGHT: 70 IN | BODY MASS INDEX: 26.49 KG/M2 | WEIGHT: 185.01 LBS

## 2020-10-03 DIAGNOSIS — Z86.73: ICD-10-CM

## 2020-10-03 DIAGNOSIS — Z77.22: ICD-10-CM

## 2020-10-03 DIAGNOSIS — K21.9: ICD-10-CM

## 2020-10-03 DIAGNOSIS — R07.89: Primary | ICD-10-CM

## 2020-10-03 LAB
ABSOLUTE BASOPHILS: 0.1 THOU/UL (ref 0–0.2)
ABSOLUTE EOSINOPHILS: 0.1 THOU/UL (ref 0–0.7)
ABSOLUTE MONOCYTES: 0.5 THOU/UL (ref 0–1.2)
ALBUMIN SERPL-MCNC: 4.3 G/DL (ref 3.4–5)
ALP SERPL-CCNC: 78 U/L (ref 46–116)
ALT SERPL-CCNC: 30 U/L (ref 30–65)
ANION GAP SERPL CALC-SCNC: 8 MMOL/L (ref 7–16)
AST SERPL-CCNC: 18 U/L (ref 15–37)
BASOPHILS NFR BLD AUTO: 1.3 %
BILIRUB SERPL-MCNC: 0.6 MG/DL
BUN SERPL-MCNC: 23 MG/DL (ref 7–18)
CALCIUM SERPL-MCNC: 9.5 MG/DL (ref 8.5–10.1)
CHLORIDE SERPL-SCNC: 102 MMOL/L (ref 98–107)
CO2 SERPL-SCNC: 31 MMOL/L (ref 21–32)
CREAT SERPL-MCNC: 1.4 MG/DL (ref 0.6–1.3)
EOSINOPHIL NFR BLD: 2.7 %
GLUCOSE SERPL-MCNC: 106 MG/DL (ref 70–99)
GRANULOCYTES NFR BLD MANUAL: 45.1 %
HCT VFR BLD CALC: 45.8 % (ref 42–52)
HGB BLD-MCNC: 15.7 GM/DL (ref 14–18)
LIPASE: 136 U/L (ref 73–393)
LYMPHOCYTES # BLD: 2.2 THOU/UL (ref 0.8–5.3)
LYMPHOCYTES NFR BLD AUTO: 40.9 %
MAGNESIUM SERPL-MCNC: 2.4 MG/DL (ref 1.8–2.4)
MCH RBC QN AUTO: 31.5 PG (ref 26–34)
MCHC RBC AUTO-ENTMCNC: 34.2 G/DL (ref 28–37)
MCV RBC: 92.2 FL (ref 80–100)
MONOCYTES NFR BLD: 10 %
MPV: 7 FL. (ref 7.2–11.1)
NEUTROPHILS # BLD: 2.4 THOU/UL (ref 1.6–8.1)
NT-PRO BRAIN NAT PEPTIDE: 78 PG/ML (ref ?–300)
NUCLEATED RBCS: 0 /100WBC
PLATELET COUNT*: 238 THOU/UL (ref 150–400)
POTASSIUM SERPL-SCNC: 4 MMOL/L (ref 3.5–5.1)
PROT SERPL-MCNC: 7.9 G/DL (ref 6.4–8.2)
RBC # BLD AUTO: 4.97 MIL/UL (ref 4.5–6)
RDW-CV: 13.2 % (ref 10.5–14.5)
SODIUM SERPL-SCNC: 141 MMOL/L (ref 136–145)
WBC # BLD AUTO: 5.3 THOU/UL (ref 4–11)

## 2020-10-04 NOTE — EKG
[place] [address]
Phone:  [phone]                     ELECTROCARDIOGRAM REPORT      
_______________________________________________________________________________
 
Name:         MELLY BLANKENSHIP        Room:                     [facility]#:    [id number]     Account #:     [id number]  
Admission:    10/03/20    Attend Phys:                     
Discharge:    10/03/20    Date of Birth: [birth year]  
Date of Service: 10/03/20 1449  Report #:      [id number]
        [id number]
_______________________________________________________________________________
THIS REPORT FOR:  //name//                      
 
                          ED
                                       
Test Date:    2020-10-03               Test Time:    14:49:55
Pat Name:     MELLY BLANKENSHIP         Department:   
Patient ID:   [id number]            Room:          
Gender:                               Technician:   KS
:          1940               Requested By: Hernandez Palma
Order Number: [id number] MD:   Edmar Velazquez
                                 Measurements
Intervals                              Axis          
Rate:         82                       P:            49
WI:           152                      QRS:          -37
QRSD:         95                       T:            45
QT:           380                                    
QTc:          444                                    
                           Interpretive Statements
Sinus rhythm
Left axis deviation
Abnormal R-wave progression, late transition
Compared to ECG 2019 09:02:37
Atrial premature complex(es) no longer present
Electronically Signed On 10-4-2020 12:12:59 CDT by Edmar Velazquez
[email or web address]
 
 
 
 
 
 
 
 
 
 
 
 
 
 
 
 
 
 
 
  <ELECTRONICALLY SIGNED>
                                           By: Edmar Velazquez MD, FACC      
  10/04/20     1212
D: 10/03/20 1449   _____________________________________
T: 10/03/20 1449   Edmar Velazquez MD, [facility]        /EPI

## 2020-10-04 NOTE — EKG
[place] [address]
Phone:  [phone]                     ELECTROCARDIOGRAM REPORT      
_______________________________________________________________________________
 
Name:         MELLY BLANKENSHIP        Room:                     [facility]#:    [id number]     Account #:     [id number]  
Admission:    10/03/20    Attend Phys:                     
Discharge:    10/03/20    Date of Birth: [birth year]  
Date of Service: 10/03/20 1648  Report #:      [id number]
        [id number]
_______________________________________________________________________________
THIS REPORT FOR:  //name//                      
 
                         [facility] ED
                                       
Test Date:    2020-10-03               Test Time:    16:48:18
Pat Name:     MELLY BLANKENSHIP         Department:   
Patient ID:   [id number]            Room:          
Gender:                               Technician:   
:          1940               Requested By: Hernandez Palma
Order Number: [id number] MD:   Edmar Velazquez
                                 Measurements
Intervals                              Axis          
Rate:         71                       P:            47
AL:           154                      QRS:          -40
QRSD:         94                       T:            50
QT:           397                                    
QTc:          432                                    
                           Interpretive Statements
Sinus rhythm
late transition
Left axis deviation
Baseline wander in lead(s) V6
Compared to ECG 10/03/2020 14:49:55
No significant changes
Electronically Signed On 10-4-2020 12:13:59 CDT by Edmar Velazquez
[email or web address]
 
 
 
 
 
 
 
 
 
 
 
 
 
 
 
 
 
 
  <ELECTRONICALLY SIGNED>
                                           By: Edmar Velazquez MD, FACC      
  10/04/20     1213
D: 10/03/20 1648   _____________________________________
T: 10/03/20 1648   Edmar Velazquez MD, [facility]        /EPI

## 2020-11-06 ENCOUNTER — HOSPITAL ENCOUNTER (OUTPATIENT)
Dept: HOSPITAL 96 - M.ULTRA | Age: 80
End: 2020-11-06
Attending: FAMILY MEDICINE
Payer: MEDICARE

## 2020-11-06 DIAGNOSIS — R19.09: Primary | ICD-10-CM

## 2020-11-11 ENCOUNTER — HOSPITAL ENCOUNTER (OUTPATIENT)
Dept: HOSPITAL 96 - M.LAB | Age: 80
End: 2020-11-11
Attending: FAMILY MEDICINE
Payer: MEDICARE

## 2020-11-11 DIAGNOSIS — M51.37: ICD-10-CM

## 2020-11-11 DIAGNOSIS — N20.0: ICD-10-CM

## 2020-11-11 DIAGNOSIS — K57.30: Primary | ICD-10-CM

## 2020-11-11 DIAGNOSIS — N40.0: ICD-10-CM

## 2020-11-11 DIAGNOSIS — R19.09: ICD-10-CM

## 2020-11-11 LAB
BUN SERPL-MCNC: 23 MG/DL (ref 7–18)
CREAT SERPL-MCNC: 1.3 MG/DL (ref 0.6–1.3)

## 2020-12-12 ENCOUNTER — HOSPITAL ENCOUNTER (EMERGENCY)
Dept: HOSPITAL 96 - M.ERS | Age: 80
Discharge: HOME | End: 2020-12-12
Payer: MEDICARE

## 2020-12-12 VITALS — SYSTOLIC BLOOD PRESSURE: 152 MMHG | DIASTOLIC BLOOD PRESSURE: 83 MMHG

## 2020-12-12 VITALS — HEIGHT: 70 IN | WEIGHT: 185.01 LBS | BODY MASS INDEX: 26.49 KG/M2

## 2020-12-12 DIAGNOSIS — Z79.82: ICD-10-CM
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
## 2020-12-12 DIAGNOSIS — S01.112A: Primary | ICD-10-CM

## 2020-12-12 DIAGNOSIS — Z79.899: ICD-10-CM
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
## 2020-12-12 DIAGNOSIS — K21.9: ICD-10-CM

## 2021-02-25 ENCOUNTER — HOSPITAL ENCOUNTER (OUTPATIENT)
Dept: HOSPITAL 96 - M.ULTRA | Age: 81
End: 2021-02-25
Attending: FAMILY MEDICINE
Payer: MEDICARE

## 2021-02-25 DIAGNOSIS — M79.89: ICD-10-CM

## 2021-02-25 DIAGNOSIS — R19.09: Primary | ICD-10-CM

## 2021-03-03 ENCOUNTER — HOSPITAL ENCOUNTER (OUTPATIENT)
Dept: HOSPITAL 96 - M.LAB | Age: 81
End: 2021-03-03
Attending: FAMILY MEDICINE
Payer: MEDICARE

## 2021-03-03 DIAGNOSIS — R19.5: ICD-10-CM

## 2021-03-03 DIAGNOSIS — M25.78: ICD-10-CM

## 2021-03-03 DIAGNOSIS — N40.1: ICD-10-CM

## 2021-03-03 DIAGNOSIS — M43.17: ICD-10-CM

## 2021-03-03 DIAGNOSIS — K57.30: Primary | ICD-10-CM

## 2021-03-03 DIAGNOSIS — K42.9: ICD-10-CM

## 2021-03-03 DIAGNOSIS — R19.09: ICD-10-CM

## 2021-03-03 LAB
BUN SERPL-MCNC: 23 MG/DL (ref 7–18)
CREAT SERPL-MCNC: 1.1 MG/DL (ref 0.6–1.3)

## 2021-04-29 ENCOUNTER — HOSPITAL ENCOUNTER (INPATIENT)
Dept: HOSPITAL 96 - M.ERS | Age: 81
LOS: 2 days | Discharge: HOME | DRG: 65 | End: 2021-05-01
Attending: INTERNAL MEDICINE | Admitting: INTERNAL MEDICINE
Payer: MEDICARE

## 2021-04-29 VITALS — WEIGHT: 189.4 LBS | BODY MASS INDEX: 27.11 KG/M2 | HEIGHT: 70 IN

## 2021-04-29 VITALS — SYSTOLIC BLOOD PRESSURE: 124 MMHG | DIASTOLIC BLOOD PRESSURE: 84 MMHG

## 2021-04-29 VITALS — DIASTOLIC BLOOD PRESSURE: 65 MMHG | SYSTOLIC BLOOD PRESSURE: 141 MMHG

## 2021-04-29 DIAGNOSIS — D51.9: ICD-10-CM

## 2021-04-29 DIAGNOSIS — Z79.82: ICD-10-CM

## 2021-04-29 DIAGNOSIS — I63.89: Primary | ICD-10-CM

## 2021-04-29 DIAGNOSIS — G61.81: ICD-10-CM

## 2021-04-29 DIAGNOSIS — Z79.899: ICD-10-CM

## 2021-04-29 DIAGNOSIS — Z96.653: ICD-10-CM

## 2021-04-29 DIAGNOSIS — Z96.1: ICD-10-CM

## 2021-04-29 DIAGNOSIS — Z20.822: ICD-10-CM

## 2021-04-29 DIAGNOSIS — G61.0: ICD-10-CM

## 2021-04-29 DIAGNOSIS — K21.9: ICD-10-CM

## 2021-04-29 DIAGNOSIS — Z98.41: ICD-10-CM

## 2021-04-29 LAB
ABSOLUTE BASOPHILS: 0.1 THOU/UL (ref 0–0.2)
ABSOLUTE EOSINOPHILS: 0.2 THOU/UL (ref 0–0.7)
ABSOLUTE MONOCYTES: 0.6 THOU/UL (ref 0–1.2)
ALBUMIN SERPL-MCNC: 3.4 G/DL (ref 3.4–5)
ALP SERPL-CCNC: 76 U/L (ref 46–116)
ALT SERPL-CCNC: 34 U/L (ref 30–65)
ANION GAP SERPL CALC-SCNC: 8 MMOL/L (ref 7–16)
ANION GAP SERPL CALC-SCNC: 9 MMOL/L (ref 7–16)
APTT BLD: 27.9 SECONDS (ref 25–31.3)
AST SERPL-CCNC: 21 U/L (ref 15–37)
BASOPHILS NFR BLD AUTO: 1.3 %
BILIRUB SERPL-MCNC: 0.5 MG/DL
BILIRUB UR-MCNC: NEGATIVE MG/DL
BUN SERPL-MCNC: 19 MG/DL (ref 7–18)
BUN SERPL-MCNC: 21 MG/DL (ref 7–18)
CALCIUM SERPL-MCNC: 9.2 MG/DL (ref 8.5–10.1)
CALCIUM SERPL-MCNC: 9.5 MG/DL (ref 8.5–10.1)
CHLORIDE SERPL-SCNC: 105 MMOL/L (ref 98–107)
CHLORIDE SERPL-SCNC: 106 MMOL/L (ref 98–107)
CLARITY CSF: CLEAR
CO2 SERPL-SCNC: 27 MMOL/L (ref 21–32)
CO2 SERPL-SCNC: 27 MMOL/L (ref 21–32)
COLOR CSF: COLORLESS
COLOR UR: YELLOW
CREAT SERPL-MCNC: 1.1 MG/DL (ref 0.6–1.3)
CREAT SERPL-MCNC: 1.2 MG/DL (ref 0.6–1.3)
CSF RBC: 6 /MM3
CSF WBC: 2 /MM3 (ref 0–10)
EOSINOPHIL NFR BLD: 4.3 %
GLUCOSE CSF-MCNC: 51 MG/DL (ref 40–70)
GLUCOSE SERPL-MCNC: 115 MG/DL (ref 70–99)
GLUCOSE SERPL-MCNC: 77 MG/DL (ref 70–99)
GRANULOCYTES NFR BLD MANUAL: 44.5 %
HCT VFR BLD CALC: 41.5 % (ref 42–52)
HGB BLD-MCNC: 13.7 GM/DL (ref 14–18)
INR PPP: 1
KETONES UR STRIP-MCNC: NEGATIVE MG/DL
LYMPHOCYTES # BLD: 1.9 THOU/UL (ref 0.8–5.3)
LYMPHOCYTES NFR BLD AUTO: 38.1 %
MAGNESIUM SERPL-MCNC: 2.5 MG/DL (ref 1.8–2.4)
MCH RBC QN AUTO: 29.2 PG (ref 26–34)
MCHC RBC AUTO-ENTMCNC: 33 G/DL (ref 28–37)
MCV RBC: 88.6 FL (ref 80–100)
MONOCYTES NFR BLD: 11.8 %
MPV: 6.4 FL. (ref 7.2–11.1)
NEUTROPHILS # BLD: 2.3 THOU/UL (ref 1.6–8.1)
NT-PRO BRAIN NAT PEPTIDE: 82 PG/ML (ref ?–300)
NUCLEATED RBCS: 0 /100WBC
PHOSPHATE SERPL-MCNC: 3.5 MG/DL (ref 2.5–4.9)
PLATELET COUNT*: 206 THOU/UL (ref 150–400)
POTASSIUM SERPL-SCNC: 4.1 MMOL/L (ref 3.5–5.1)
POTASSIUM SERPL-SCNC: 4.5 MMOL/L (ref 3.5–5.1)
PROT CSF-MCNC: 75.2 MG/DL (ref 15–45)
PROT SERPL-MCNC: 7 G/DL (ref 6.4–8.2)
PROT UR QL STRIP: NEGATIVE
PROTHROMBIN TIME: 10.8 SECONDS (ref 9.2–11.5)
RBC # BLD AUTO: 4.68 MIL/UL (ref 4.5–6)
RBC # UR STRIP: NEGATIVE /UL
RDW-CV: 14.3 % (ref 10.5–14.5)
SODIUM SERPL-SCNC: 140 MMOL/L (ref 136–145)
SODIUM SERPL-SCNC: 142 MMOL/L (ref 136–145)
SP GR UR STRIP: 1.02 (ref 1–1.03)
SPECIMEN VOL 24H UR: 11.5 ML
URINE CLARITY: CLEAR
URINE GLUCOSE-RANDOM: NEGATIVE
URINE LEUKOCYTES-REFLEX: NEGATIVE
URINE NITRITE-REFLEX: NEGATIVE
UROBILINOGEN UR STRIP-ACNC: 0.2 E.U./DL (ref 0.2–1)
WBC # BLD AUTO: 5.1 THOU/UL (ref 4–11)

## 2021-04-29 PROCEDURE — 00JU3ZZ INSPECTION OF SPINAL CANAL, PERCUTANEOUS APPROACH: ICD-10-PCS | Performed by: EMERGENCY MEDICINE

## 2021-04-29 NOTE — EKG
[place] [address]
Phone:  [phone]                     ELECTROCARDIOGRAM REPORT      
_______________________________________________________________________________
 
Name:         MELLY BLANKENSHIP        Room:          [place] [address]   ADM IN 
[facility].#:    [id number]     Account #:     [id number]  
Admission:    21    Attend Phys:   Eloy De Jesus
Discharge:                Date of Birth: [birth year]  
Date of Service: 21 1210  Report #:      [id number]
        [id number]
_______________________________________________________________________________
THIS REPORT FOR:  //name//                      
 
                         [facility] ED
                                       
Test Date:    2021               Test Time:    12:10:42
Pat Name:     MELLY BLANKENSHIP         Department:   
Patient ID:   [id number]            Room:         [facility]
Gender:       M                        Technician:   ISIAH
:          1940               Requested By: Hernandez Palma
Order Number: [id number] MD:   Edmar Velazquez
                                 Measurements
Intervals                              Axis          
Rate:         74                       P:            45
DC:           170                      QRS:          -32
QRSD:         91                       T:            37
QT:           407                                    
QTc:          452                                    
                           Interpretive Statements
Sinus rhythm
 
Compared to ECG 10/03/2020 16:48:18
 
no change
Electronically Signed On 2021 15:49:54 CDT by Edmar Velazquez
[email or web address]
 
 
 
 
 
 
 
 
 
 
 
 
 
 
 
 
 
 
 
  <ELECTRONICALLY SIGNED>
                                           By: Edmar Velazquez MD, [facility]      
  21     1549
D: 21 1210   _____________________________________
T: 21 1210   Edmar Velazquez MD, [facility]        /EPI

## 2021-04-29 NOTE — CON
[address]  [address]                    CONSULTATION                  
_______________________________________________________________________________
 
Name:       MELLY BLANKENSHIP         Room:           [address] [place]  
M.R.#:  [id number]      Account #:      [id number]  
Admission:  04/29/21     Attend Phys:    ANA ROSA Orozco
Discharge:               Date of Birth:  [birth year]  
         Report #: [id number]
                                                                     [id number]
_______________________________________________________________________________
THIS REPORT FOR:  
 
cc:  Jax Stapleton Vincent R. DO Khosla,Gerard HASSAN MD                                              ~
 
 
DOC #: [id number]
Gerard Rodriguez MD
DATE OF CONSULTATION: 04/29/2021
 
HISTORY OF PRESENT ILLNESS:  This is a 80-year-old male patient who was seen by 
me for somewhat unusual history.  Initially, it looked like the history was 
different, but the history I get is that he started having difficulty with 
ambulation 2-3 years ago.  He thinks he became worse about 6 months ago.  He 
went to his family doctor today who sent him to the emergency room.  He has a 
history of neuropathy, but I am not sure how much workup has been done.  He does
have a tremor and he has evaluation at [facility], but I do not have any 
workup in that regard.  He has a big bruise on his leg, but he does not know 
when it happened.
 
REVIEW OF SYSTEMS:  Positive for problem with the left eye.  He had some vein 
problems in the past.  He is weak in all 4 extremities.  He has tremor.  He has 
a problem with the testicles in the past.  He has body aches to some extent.  He
is on Wellbutrin and multiple medications like gabapentin and lorazepam.  It is 
not clear how long and why he is on that. A 14-point review of systems was 
otherwise noncontributory, but it was carried out.
 
PAST MEDICAL HISTORY:  Positive for neuropathy, but further information is not 
available.
 
FAMILY HISTORY:  Unremarkable.
 
SOCIAL HISTORY:  He does not abuse alcohol.
 
PHYSICAL EXAMINATION:   The patient's exam indicates he is alert, responsive, 
able to follow simple and complex commands.  His speech, concentration, fund of 
knowledge and memory is at his baseline.  Cranial nerve examination II-XII does 
not appear to be showing any definite abnormality.  He is weak in all 4 
extremities, including his upper extremities.  His position sense is intact, but
he has reflexes that I cannot elicit in the lower extremities.  There is no 
meningeal sign.  There is no carotid bruit.  His vision and hearing looks 
adequate. His cardiac and respiratory examination is unremarkable.  He has no 
edema, cyanosis or jaundice.  He is very well-built individual.  His blood 
pressure is 143/89, respirations 18 and pulse is 70.
 
 
 
 
[place] [address]                    CONSULTATION                  
_______________________________________________________________________________
 
Name:       MELLY BLANKENSHIP         Room:           [address] [place]  
M.R.#:  [id number]      Account #:      [id number]  
Admission:  04/29/21     Attend Phys:    ANA ROSA Orozco
Discharge:               Date of Birth:  [birth year]  
         Report #: [id number]
                                                                     [id number]
_______________________________________________________________________________
 
 
LABORATORY DATA:  Indicated normal white count.  He appears to have some workup 
in the past including a B12 level, he does not know why it was done.  I reviewed
his record and this patient was seen by Dr. More and even by me at one time 
and his MRI at that time has demonstrated pretty extensive changes.  There were 
all chronic.
 
ASSESSMENT AND PLAN:  It looks like the patient's problem is chronic.  It is 
going on for a long time.  He has pretty extensive changes on the MRI, which are
chronic which probably is contributing to his symptoms.  I will do an MRI of the
C-spine to see if we can find some changes there.  He has a known neuropathy and
he had some workup done in the past.  We will not repeat that.  His CRP today is
normal.  He has LEIGH and rheumatoid factor in the past, which was also 
unremarkable.  I will await his repeat MRI to see if there is an interval change
or we can find something in his cervical spine to explain his symptoms.  
Otherwise, I think we will proceed with an LP on the patient and he does need an
EMG and he has been told to do it as outpatient, even in 2019 I told him that he
needs to get that done.
 
MD MADALYN Cuevas/MARIANELA/ELVIN
 
D: 04/29/2021 03:38 PM
T: 04/29/2021 11:20 PM
 
 
 
 
 
 
 
 
 
 
 
 
 
 
 
 
 
 
 
                       
                                        By:                                
                 
D: 04/29/21 1438_______________________________________
T: 04/29/21 2220Gerard Rodriguez MD            /nt

## 2021-04-29 NOTE — NUR
PT ORIENTED TO ROOM AND UNIT, BEDF LOW AND LOCKED, SIDE RAILS UPX3, CALL
LIGHT IN REACH, TELE APPLIED. WILL CONTINUE TO ASSESS.

## 2021-04-30 VITALS — DIASTOLIC BLOOD PRESSURE: 77 MMHG | SYSTOLIC BLOOD PRESSURE: 107 MMHG

## 2021-04-30 VITALS — DIASTOLIC BLOOD PRESSURE: 74 MMHG | SYSTOLIC BLOOD PRESSURE: 126 MMHG

## 2021-04-30 VITALS — DIASTOLIC BLOOD PRESSURE: 64 MMHG | SYSTOLIC BLOOD PRESSURE: 131 MMHG

## 2021-04-30 VITALS — SYSTOLIC BLOOD PRESSURE: 130 MMHG | DIASTOLIC BLOOD PRESSURE: 85 MMHG

## 2021-04-30 VITALS — DIASTOLIC BLOOD PRESSURE: 79 MMHG | SYSTOLIC BLOOD PRESSURE: 131 MMHG

## 2021-04-30 LAB
ANION GAP SERPL CALC-SCNC: 9 MMOL/L (ref 7–16)
BUN SERPL-MCNC: 20 MG/DL (ref 7–18)
CALCIUM SERPL-MCNC: 9.5 MG/DL (ref 8.5–10.1)
CHLORIDE SERPL-SCNC: 104 MMOL/L (ref 98–107)
CO2 SERPL-SCNC: 26 MMOL/L (ref 21–32)
CREAT SERPL-MCNC: 1.2 MG/DL (ref 0.6–1.3)
GLUCOSE SERPL-MCNC: 165 MG/DL (ref 70–99)
HCT VFR BLD CALC: 44 % (ref 42–52)
HGB BLD-MCNC: 14.5 GM/DL (ref 14–18)
MCH RBC QN AUTO: 29.1 PG (ref 26–34)
MCHC RBC AUTO-ENTMCNC: 32.9 G/DL (ref 28–37)
MCV RBC: 88.5 FL (ref 80–100)
MPV: 6.7 FL. (ref 7.2–11.1)
PLATELET COUNT*: 219 THOU/UL (ref 150–400)
POTASSIUM SERPL-SCNC: 4.5 MMOL/L (ref 3.5–5.1)
RBC # BLD AUTO: 4.97 MIL/UL (ref 4.5–6)
RDW-CV: 14 % (ref 10.5–14.5)
SODIUM SERPL-SCNC: 139 MMOL/L (ref 136–145)
WBC # BLD AUTO: 5.2 THOU/UL (ref 4–11)

## 2021-04-30 NOTE — NUR
PT REMAINS ALERT AND ORIENTED. WIFE AT BEDSIDE. FALL PRECAUTIONS REMAIN IN
PLACE. WILL CONTINUE TO MONITOR.

## 2021-04-30 NOTE — NUR
CM spoke with Pt's wife. Pt normally independent. No DME. No hx of HH or SNF.
Confirmed CVA. ARU consulted. Neuro following. Therapies ordered. CM following
for dispo

## 2021-04-30 NOTE — 2DMMODE
[place] [address]
Phone:  [phone] 2 D/M-MODE ECHOCARDIOGRAM     
_______________________________________________________________________________
 
Name:         MELLY BLANKENSHIP        Room:          [address] [place] 
LORENZO#:    [id number]     Account #:     [id number]  
Admission:    21    Attend Phys:   Eloy De Jesus
Discharge:                Date of Birth: [birth year]  
Date of Service: 21 1134  Report #:      [id number]
        [id number]
_______________________________________________________________________________
THIS REPORT FOR:
 
cc:  Jax Stapleton,Jax Taylor,Edmar GUZMAN MD [facility]        
                                                                       ~
 
--------------- APPROVED REPORT --------------
 
 
Study performed:  2021 10:05:11
 
EXAM: Comprehensive 2D, Doppler, and color-flow 
Echocardiogram 
Patient Location: In-Patient   
Room #:  [facility]     Status:  routine
 
      BSA:         2.07
HR: 81 bpm BP:          131/64 mmHg 
Rhythm: NSR     
 
Other Information 
Study Quality: Good
 
Indications
CVA/TIA 
 
Echo Enhancing Agent
Indication: Rule out Shunt
Agent(s) / Amount(s) Used: Agitated Saline 10 cc
 
2D Dimensions
IVSd:  13.40 (7-11mm) LVOT Diam:  19.43 (18-24mm) 
LVDd:  44.78 mm  
PWd:  9.92 (7-11mm) Ascending Ao:  31.29 (22-36mm)
LVDs:  25.16 (25-40mm) 
Aortic Root:  31.04 mm 
 
Volumes
Left Atrial Volume (Systole) 
    LA ESV Index:  19.70 mL/m2
 
Aortic Valve
AoV Peak Oscar.:  1.85 m/s 
AO Peak Gr.:  13.71 mmHg LVOT Max P.16 mmHg
AO Mean Gr.:  6.61 mmHg  LVOT Mean PG:  3.29 mmHg
 
 
[place] [address]
Phone:  [phone]                     2 D/M-MODE ECHOCARDIOGRAM     
_______________________________________________________________________________
 
Name:         MELLY BLANKENSHIP        Room:          [address] [place] 
.R.#:    [id number]     Account #:     [id number]  
Admission:    21    Attend Phys:   Eloy De Jesus
Discharge:                Date of Birth: [birth year]  
Date of Service: 21 1134  Report #:      [id number]
        [id number]
_______________________________________________________________________________
    LVOT Max V:  1.34 m/s
AO V2 VTI:  29.77 cm  LVOT Mean V:  0.83 m/s
HYACINTH (VTI):  2.31 cm2  LVOT V1 VTI:  23.25 cm
 
Mitral Valve
    E/A Ratio:  0.71
    MV Decel. Time:  319.08 ms
MV E Max Oscar.:  0.50 m/s 
MV PHT:  92.53 ms  
MVA (PHT):  2.38 cm2  
 
TDI
E/Lateral E':  7.14 E/Medial E':  7.14
   Medial E' Oscar.:  0.07 m/s
   Lateral E' Oscar.:  0.07 m/s
 
Pulmonary Valve
PV Peak Oscar.:  1.88 m/s PV Peak Gr.:  14.13 mmHg
 
Tricuspid Valve
    RAP Estimate:  5.00 mmHg
TR Peak Gr.:  21.42 mmHg RVSP:  26.00 mmHg
    PA Pressure:  26.00 mmHg
 
Left Ventricle
The left ventricle is normal size. There is normal LV segmental wall 
motion. There is normal left ventricular wall thickness. Left 
ventricular systolic function is normal. The left ventricular 
ejection fraction is within the normal range. LVEF is 60-65%. Grade I 
- abnormal relaxation pattern.
 
Right Ventricle
The right ventricle is normal size. The right ventricular systolic 
function is normal.
 
Atria
The left atrium size is normal. The interatrial septum is intact with 
no evidence for an atrial septal defect. The right atrium size is 
normal.
 
Aortic Valve
Mild aortic valve sclerosis. Mild aortic regurgitation. There is no 
aortic valvular stenosis.
 
Mitral Valve
The mitral valve is normal in structure. Mild mitral regurgitation. 
 
 
[place] [address]
Phone:  [phone]                     2 D/M-MODE ECHOCARDIOGRAM     
_______________________________________________________________________________
 
Name:         MELLY BLANKENSHIP        Room:          [address] [place] 
..#:    [id number]     Account #:     [id number]  
Admission:    21    Attend Phys:   Eloy De Jesus
Discharge:                Date of Birth: [birth year]  
Date of Service: 21 1134  Report #:      [id number]
        [id number]
_______________________________________________________________________________
No evidence of mitral valve stenosis.
 
Tricuspid Valve
The tricuspid valve is normal in structure. Mild tricuspid 
regurgitation. Unable to assess PA pressure.
 
Pulmonic Valve
The pulmonary valve is normal in structure. Trace pulmonic 
regurgitation.
 
Great Vessels
The aortic root is normal in size. IVC is normal in size and 
collapses >50% with inspiration.
 
Pericardium
There is no pericardial effusion.
 
<Conclusion>
LVEF is 60-65%.
Mild aortic valve sclerosis.
Mild aortic regurgitation.
Mild mitral regurgitation.
The interatrial septum is intact with no evidence for an atrial 
septal defect.
 
 
 
 
 
 
 
 
 
 
 
 
 
 
 
 
 
 
 
 
  <ELECTRONICALLY SIGNED>
                                           By: Edmar Velazquez MD, FACC      
  21     1134
D: 21 1134   _____________________________________
T: 21 1134   Edmar Velazquez MD, FACC        /INF

## 2021-05-01 VITALS — DIASTOLIC BLOOD PRESSURE: 73 MMHG | SYSTOLIC BLOOD PRESSURE: 120 MMHG

## 2021-05-01 VITALS — DIASTOLIC BLOOD PRESSURE: 81 MMHG | SYSTOLIC BLOOD PRESSURE: 144 MMHG

## 2021-05-01 VITALS — DIASTOLIC BLOOD PRESSURE: 78 MMHG | SYSTOLIC BLOOD PRESSURE: 116 MMHG

## 2021-05-01 VITALS — SYSTOLIC BLOOD PRESSURE: 116 MMHG | DIASTOLIC BLOOD PRESSURE: 78 MMHG

## 2021-05-01 LAB
ALBUMIN SERPL-MCNC: 2.8 G/DL (ref 3.4–5)
ALP SERPL-CCNC: 72 U/L (ref 46–116)
ALT SERPL-CCNC: 27 U/L (ref 30–65)
ANION GAP SERPL CALC-SCNC: 7 MMOL/L (ref 7–16)
AST SERPL-CCNC: 18 U/L (ref 15–37)
BILIRUB SERPL-MCNC: 0.4 MG/DL
BUN SERPL-MCNC: 22 MG/DL (ref 7–18)
CALCIUM SERPL-MCNC: 8.1 MG/DL (ref 8.5–10.1)
CHLORIDE SERPL-SCNC: 108 MMOL/L (ref 98–107)
CHOLEST SERPL-MCNC: 150 MG/DL (ref ?–200)
CO2 SERPL-SCNC: 28 MMOL/L (ref 21–32)
CREAT SERPL-MCNC: 1.3 MG/DL (ref 0.6–1.3)
GLUCOSE SERPL-MCNC: 87 MG/DL (ref 70–99)
HCT VFR BLD CALC: 39.2 % (ref 42–52)
HDLC SERPL-MCNC: 42 MG/DL (ref 40–?)
HGB BLD-MCNC: 12.7 GM/DL (ref 14–18)
LDLC SERPL-MCNC: 94 MG/DL (ref ?–100)
MCH RBC QN AUTO: 29 PG (ref 26–34)
MCHC RBC AUTO-ENTMCNC: 32.5 G/DL (ref 28–37)
MCV RBC: 89.4 FL (ref 80–100)
MPV: 6.9 FL. (ref 7.2–11.1)
PLATELET COUNT*: 206 THOU/UL (ref 150–400)
POTASSIUM SERPL-SCNC: 4 MMOL/L (ref 3.5–5.1)
PROT SERPL-MCNC: 5.8 G/DL (ref 6.4–8.2)
RBC # BLD AUTO: 4.39 MIL/UL (ref 4.5–6)
RDW-CV: 14.4 % (ref 10.5–14.5)
SERUM ASSESSMENT: CLEAR
SODIUM SERPL-SCNC: 143 MMOL/L (ref 136–145)
TC:HDL: 3.6 RATIO
TRIGL SERPL-MCNC: 70 MG/DL (ref ?–150)
VLDLC SERPL CALC-MCNC: 14 MG/DL (ref ?–40)
WBC # BLD AUTO: 4.2 THOU/UL (ref 4–11)

## 2021-05-01 NOTE — NUR
Reviewed discharge teaching with patient and wife; verbalized understanding.
Discontinued IV and telemetry monitor.  Pt discharged from unit per WC.

## 2021-05-01 NOTE — NUR
ASSUMED PT CARE AT APPROX 1930. PT IS AWAKE AND ORIENTED X4. PT IS NOT IN
DISTRESS, NO DESATURATIONS NOTED ON ROOM AIR. PT IS TRACING SR ON THE CARDIAC
MONITOR. NO ACUTE CHANGES THIS SHIFT. CALL LIGHT WITHIN REACH. HIGH FALL
PRECAUTIONS IN PLACE. HOURLY ROUNDING DONE FOR PT SAFETY.

## 2021-05-02 LAB
EST. AVERAGE GLUCOSE BLD GHB EST-MCNC: 120 MG/DL
GLYCOHEMOGLOBIN (HGB A1C): 5.8 % (ref 4.8–5.6)

## 2021-05-13 ENCOUNTER — HOSPITAL ENCOUNTER (EMERGENCY)
Dept: HOSPITAL 96 - M.ERS | Age: 81
Discharge: HOME | End: 2021-05-13
Payer: MEDICARE

## 2021-05-13 VITALS — SYSTOLIC BLOOD PRESSURE: 145 MMHG | DIASTOLIC BLOOD PRESSURE: 65 MMHG

## 2021-05-13 VITALS — BODY MASS INDEX: 27.92 KG/M2 | WEIGHT: 195 LBS | HEIGHT: 70 IN

## 2021-05-13 DIAGNOSIS — K21.9: ICD-10-CM

## 2021-05-13 DIAGNOSIS — Z77.22: ICD-10-CM

## 2021-05-13 DIAGNOSIS — M25.512: Primary | ICD-10-CM

## 2021-05-13 DIAGNOSIS — R07.81: ICD-10-CM

## 2021-05-17 ENCOUNTER — HOSPITAL ENCOUNTER (OUTPATIENT)
Dept: HOSPITAL 96 - M.ULTRA | Age: 81
End: 2021-05-17
Attending: PHYSICIAN ASSISTANT
Payer: MEDICARE

## 2021-05-17 DIAGNOSIS — E04.1: Primary | ICD-10-CM

## 2021-05-21 ENCOUNTER — HOSPITAL ENCOUNTER (OUTPATIENT)
Dept: HOSPITAL 96 - M.ULTRA | Age: 81
Discharge: HOME | End: 2021-05-21
Attending: FAMILY MEDICINE
Payer: MEDICARE

## 2021-05-21 DIAGNOSIS — Z96.653: ICD-10-CM

## 2021-05-21 DIAGNOSIS — E04.1: Primary | ICD-10-CM

## 2021-05-21 DIAGNOSIS — Z87.442: ICD-10-CM

## 2021-05-21 DIAGNOSIS — F32.9: ICD-10-CM

## 2021-05-21 DIAGNOSIS — Z79.899: ICD-10-CM

## 2021-05-21 DIAGNOSIS — Z98.890: ICD-10-CM

## 2021-05-21 DIAGNOSIS — Z86.73: ICD-10-CM

## 2021-05-21 DIAGNOSIS — K21.9: ICD-10-CM

## 2021-05-25 ENCOUNTER — HOSPITAL ENCOUNTER (OUTPATIENT)
Dept: HOSPITAL 96 - M.RAD | Age: 81
End: 2021-05-25
Attending: NURSE PRACTITIONER
Payer: MEDICARE

## 2021-05-25 DIAGNOSIS — M81.0: Primary | ICD-10-CM

## 2021-05-25 DIAGNOSIS — M85.80: ICD-10-CM

## 2021-05-25 DIAGNOSIS — E04.1: ICD-10-CM

## 2021-05-25 DIAGNOSIS — M85.822: ICD-10-CM

## 2021-08-27 ENCOUNTER — HOSPITAL ENCOUNTER (EMERGENCY)
Dept: HOSPITAL 96 - M.ERS | Age: 81
LOS: 1 days | Discharge: HOME | End: 2021-08-28
Payer: MEDICARE

## 2021-08-27 VITALS — WEIGHT: 189.99 LBS | BODY MASS INDEX: 27.2 KG/M2 | HEIGHT: 70 IN
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
## 2021-08-27 DIAGNOSIS — K21.9: ICD-10-CM

## 2021-08-27 DIAGNOSIS — M25.561: Primary | ICD-10-CM
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
## 2021-08-27 DIAGNOSIS — Z79.899: ICD-10-CM

## 2021-08-27 DIAGNOSIS — Z79.82: ICD-10-CM
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
## 2021-08-27 DIAGNOSIS — M25.511: ICD-10-CM

## 2021-08-28 VITALS — SYSTOLIC BLOOD PRESSURE: 140 MMHG | DIASTOLIC BLOOD PRESSURE: 82 MMHG

## 2021-08-28 NOTE — EKG
[place] [address]
Phone:  [phone]                     ELECTROCARDIOGRAM REPORT      
_______________________________________________________________________________
 
Name:         MELLY BLANKENSHIP        Room:                     [facility]#:    [id number]     Account #:     [id number]  
Admission:    21    Attend Phys:                     
Discharge:    21    Date of Birth: [birth year]  
Date of Service: 21 0052  Report #:      [id number]
        [id number]
_______________________________________________________________________________
THIS REPORT FOR:  //name//                      
 
                         [facility] ED
                                       
Test Date:    2021               Test Time:    00:52:43
Pat Name:     MELLY BLANKENSHIP         Department:   
Patient ID:   [id number]            Room:          
Gender:                               Technician:   [facility]
:          1940               Requested By: Lorenza Clemens
Order Number: [id number]    Jose MD:   Bc Lobo
                                 Measurements
Intervals                              Axis          
Rate:         118                      P:            48
WA:           133                      QRS:          40
QRSD:         71                       T:            8
QT:           314                                    
QTc:          441                                    
                           Interpretive Statements
Sinus tachycardia
Compared to ECG 2021 12:10:42
Sinus rate has increased
Electronically Signed On 2021 11:10:38 CDT by Bc Lobo
[email or web address]
 
 
 
 
 
 
 
 
 
 
 
 
 
 
 
 
 
 
 
 
 
  <ELECTRONICALLY SIGNED>
                                           By: Bc Lobo MD, [facility]     
  21     1110
D: 21   _____________________________________
T: 21   Bc Lobo MD, FAC       /EPI